# Patient Record
Sex: MALE | Race: WHITE | NOT HISPANIC OR LATINO | URBAN - METROPOLITAN AREA
[De-identification: names, ages, dates, MRNs, and addresses within clinical notes are randomized per-mention and may not be internally consistent; named-entity substitution may affect disease eponyms.]

---

## 2024-03-31 ENCOUNTER — OFFICE VISIT (OUTPATIENT)
Dept: URGENT CARE | Facility: CLINIC | Age: 39
End: 2024-03-31
Payer: COMMERCIAL

## 2024-03-31 DIAGNOSIS — T75.3XXA MOTION SICKNESS, INITIAL ENCOUNTER: Primary | ICD-10-CM

## 2024-03-31 PROCEDURE — 99202 OFFICE O/P NEW SF 15 MIN: CPT | Performed by: PHYSICIAN ASSISTANT

## 2024-03-31 RX ORDER — SCOLOPAMINE TRANSDERMAL SYSTEM 1 MG/1
1 PATCH, EXTENDED RELEASE TRANSDERMAL
Qty: 10 PATCH | Refills: 0 | Status: SHIPPED | OUTPATIENT
Start: 2024-03-31 | End: 2024-04-30

## 2024-03-31 NOTE — PROGRESS NOTES
Subjective   Patient ID: Александр Stockton is a 38 y.o. male who presents for Med Refill (Medication refill for travel).  Patient has an underlying history of motion sickness that is triggered by being passenger in a car or flying or even video games that have quick changes in camera angles.  He has been treated in the past with scopolamine patches and notes that these are quite helpful for him.  Patient getting ready to travel to [place] and will be flying on an airplane and notes that this often times is a trigger for him.  Patient requesting refill of his prior medication as his current supply is .  The patient denies any symptoms at time of visit whatsoever and states that he feels quite well      The remainder of the systems were reviewed and are negative unless noted above      Objective   There were no vitals taken for this visit.  Physical Exam  Constitutional:       Appearance: Normal appearance.   HENT:      Head: Normocephalic.   Cardiovascular:      Rate and Rhythm: Normal rate and regular rhythm.   Pulmonary:      Effort: Pulmonary effort is normal.      Breath sounds: Normal breath sounds.   Skin:     General: Skin is warm and dry.   Neurological:      Mental Status: He is alert.         Assessment/Plan   Problem List Items Addressed This Visit       Motion sickness - Primary    Relevant Medications    scopolamine (Transderm-Scop) 1 mg over 3 days patch 3 day      Patient is here for an urgent refill of his chronic medications for motion sickness.  Previously successfully treated with scopolamine patches.  I am refilling this for the patient as he is going to be traveling and is concerned about the possibility of experiencing motion sickness.

## 2024-03-31 NOTE — PATIENT INSTRUCTIONS
Assessment/Plan   Problem List Items Addressed This Visit       Motion sickness - Primary    Relevant Medications    scopolamine (Transderm-Scop) 1 mg over 3 days patch 3 day      Patient is here for an urgent refill of his chronic medications for motion sickness.  Previously successfully treated with scopolamine patches.  I am refilling this for the patient as he is going to be traveling and is concerned about the possibility of experiencing motion sickness.

## 2025-05-23 ENCOUNTER — OFFICE VISIT (OUTPATIENT)
Dept: URGENT CARE | Age: 40
End: 2025-05-23
Payer: COMMERCIAL

## 2025-05-23 VITALS
OXYGEN SATURATION: 99 % | WEIGHT: 175 LBS | DIASTOLIC BLOOD PRESSURE: 84 MMHG | BODY MASS INDEX: 24.5 KG/M2 | HEIGHT: 71 IN | TEMPERATURE: 97.2 F | SYSTOLIC BLOOD PRESSURE: 133 MMHG | HEART RATE: 86 BPM | RESPIRATION RATE: 18 BRPM

## 2025-05-23 DIAGNOSIS — L03.811 CELLULITIS OF HEAD EXCEPT FACE: Primary | ICD-10-CM

## 2025-05-23 RX ORDER — CEPHALEXIN 500 MG/1
500 CAPSULE ORAL 4 TIMES DAILY
Qty: 30 CAPSULE | Refills: 0 | Status: SHIPPED | OUTPATIENT
Start: 2025-05-23 | End: 2025-05-30

## 2025-05-23 RX ORDER — MUPIROCIN 20 MG/G
OINTMENT TOPICAL
Qty: 22 G | Refills: 0 | Status: SHIPPED | OUTPATIENT
Start: 2025-05-23 | End: 2025-06-02

## 2025-05-23 RX ORDER — MUPIROCIN 20 MG/G
OINTMENT TOPICAL
Qty: 22 G | Refills: 0 | Status: SHIPPED | OUTPATIENT
Start: 2025-05-23 | End: 2025-05-23

## 2025-05-23 RX ORDER — CEPHALEXIN 500 MG/1
500 CAPSULE ORAL 4 TIMES DAILY
Qty: 30 CAPSULE | Refills: 0 | Status: SHIPPED | OUTPATIENT
Start: 2025-05-23 | End: 2025-05-23

## 2025-05-23 RX ORDER — CEPHALEXIN 500 MG/1
500 CAPSULE ORAL 4 TIMES DAILY
Qty: 14 CAPSULE | Refills: 0 | Status: SHIPPED | OUTPATIENT
Start: 2025-05-23 | End: 2025-05-23

## 2025-05-23 ASSESSMENT — ENCOUNTER SYMPTOMS: CONSTITUTIONAL NEGATIVE: 1

## 2025-05-23 NOTE — PROGRESS NOTES
"Subjective   Patient ID: Александр Stockton is a 39 y.o. male. They present today with a chief complaint of Other (Patient states he woke up yesterday, on the back of his head on the right side it is swollen, also under his chin is a lump that is swollen.).    History of Present Illness  Pt presents with concerns over abrasions on scalp and painful adenopathy on back of neck which he got at a shooting range          Past Medical History  Allergies as of 05/23/2025    (No Known Allergies)       Prescriptions Prior to Admission[1]     Medical History[2]    Surgical History[3]     reports that he has never smoked. He has never used smokeless tobacco.    Review of Systems  Review of Systems   Constitutional: Negative.                                   Objective    Vitals:    05/23/25 0957   BP: 133/84   Pulse: 86   Resp: 18   Temp: 36.2 °C (97.2 °F)   TempSrc: Oral   SpO2: 99%   Weight: 79.4 kg (175 lb)   Height: 1.803 m (5' 11\")     No LMP for male patient.    Physical Exam  Constitutional:       Appearance: Normal appearance.   HENT:      Head:      Comments: 3 erythematous abrasions on scalp with adenopathy of back of neck     Mouth/Throat:      Mouth: Mucous membranes are moist.   Eyes:      Pupils: Pupils are equal, round, and reactive to light.   Pulmonary:      Effort: Pulmonary effort is normal.      Breath sounds: Normal breath sounds.   Lymphadenopathy:      Cervical: Cervical adenopathy present.   Neurological:      Mental Status: He is alert.         Procedures    Point of Care Test & Imaging Results from this visit  No results found for this visit on 05/23/25.   Imaging  No results found.    Cardiology, Vascular, and Other Imaging  No other imaging results found for the past 2 days      Diagnostic study results (if any) were reviewed by Latonya Dooley MD.    Assessment/Plan   Allergies, medications, history, and pertinent labs/EKGs/Imaging reviewed by Latonya Dooley MD.     Medical Decision Making  Abrasion " with cellulitis on scalp and cheek     Orders and Diagnoses  Diagnoses and all orders for this visit:  Cellulitis of head except face  -     mupirocin (Bactroban) 2 % ointment; Apply topically 3 times a day for 10 days.  -     cephalexin (Keflex) 500 mg capsule; Take 1 capsule (500 mg) by mouth 4 times a day for 10 days.      Medical Admin Record      Patient disposition: Home    Electronically signed by Latonya Dooley MD  10:06 AM           [1] (Not in a hospital admission)  [2] No past medical history on file.  [3] No past surgical history on file.